# Patient Record
Sex: FEMALE | Race: WHITE | NOT HISPANIC OR LATINO | URBAN - METROPOLITAN AREA
[De-identification: names, ages, dates, MRNs, and addresses within clinical notes are randomized per-mention and may not be internally consistent; named-entity substitution may affect disease eponyms.]

---

## 2020-09-20 ENCOUNTER — INPATIENT HOSPITAL (OUTPATIENT)
Dept: URBAN - METROPOLITAN AREA HOSPITAL 130 | Facility: HOSPITAL | Age: 35
End: 2020-09-20

## 2020-09-20 DIAGNOSIS — E53.8 DEFICIENCY OF OTHER SPECIFIED B GROUP VITAMINS: ICD-10-CM

## 2020-09-20 DIAGNOSIS — K75.89 OTHER SPECIFIED INFLAMMATORY LIVER DISEASES: ICD-10-CM

## 2020-09-20 DIAGNOSIS — K90.9 INTESTINAL MALABSORPTION, UNSPECIFIED: ICD-10-CM

## 2020-09-20 DIAGNOSIS — D50.9 IRON DEFICIENCY ANEMIA, UNSPECIFIED: ICD-10-CM

## 2020-09-20 DIAGNOSIS — R65.20 SEVERE SEPSIS WITHOUT SEPTIC SHOCK: ICD-10-CM

## 2020-09-20 DIAGNOSIS — R94.5 ABNORMAL RESULTS OF LIVER FUNCTION STUDIES: ICD-10-CM

## 2020-09-20 PROCEDURE — 99222 1ST HOSP IP/OBS MODERATE 55: CPT | Performed by: INTERNAL MEDICINE

## 2020-09-21 ENCOUNTER — INPATIENT HOSPITAL (OUTPATIENT)
Dept: URBAN - METROPOLITAN AREA HOSPITAL 130 | Facility: HOSPITAL | Age: 35
End: 2020-09-21

## 2020-09-21 DIAGNOSIS — R65.20 SEVERE SEPSIS WITHOUT SEPTIC SHOCK: ICD-10-CM

## 2020-09-21 DIAGNOSIS — E53.8 DEFICIENCY OF OTHER SPECIFIED B GROUP VITAMINS: ICD-10-CM

## 2020-09-21 DIAGNOSIS — K75.89 OTHER SPECIFIED INFLAMMATORY LIVER DISEASES: ICD-10-CM

## 2020-09-21 DIAGNOSIS — D50.9 IRON DEFICIENCY ANEMIA, UNSPECIFIED: ICD-10-CM

## 2020-09-21 DIAGNOSIS — R94.5 ABNORMAL RESULTS OF LIVER FUNCTION STUDIES: ICD-10-CM

## 2020-09-21 PROCEDURE — 99231 SBSQ HOSP IP/OBS SF/LOW 25: CPT | Performed by: INTERNAL MEDICINE

## 2025-07-08 ENCOUNTER — OFFICE (OUTPATIENT)
Dept: URBAN - METROPOLITAN AREA CLINIC 19 | Facility: CLINIC | Age: 40
End: 2025-07-08
Payer: MEDICARE

## 2025-07-08 VITALS
HEIGHT: 65 IN | WEIGHT: 106 LBS | OXYGEN SATURATION: 100 % | SYSTOLIC BLOOD PRESSURE: 136 MMHG | DIASTOLIC BLOOD PRESSURE: 91 MMHG | HEART RATE: 105 BPM

## 2025-07-08 DIAGNOSIS — K29.60 OTHER GASTRITIS WITHOUT BLEEDING: ICD-10-CM

## 2025-07-08 DIAGNOSIS — K85.90 ACUTE PANCREATITIS WITHOUT NECROSIS OR INFECTION, UNSPECIFIE: ICD-10-CM

## 2025-07-08 DIAGNOSIS — K22.70 BARRETT'S ESOPHAGUS WITHOUT DYSPLASIA: ICD-10-CM

## 2025-07-08 DIAGNOSIS — Z90.49 ACQUIRED ABSENCE OF OTHER SPECIFIED PARTS OF DIGESTIVE TRACT: ICD-10-CM

## 2025-07-08 PROCEDURE — 99204 OFFICE O/P NEW MOD 45 MIN: CPT

## 2025-07-08 RX ORDER — PANTOPRAZOLE SODIUM 40 MG/1
80 TABLET, DELAYED RELEASE ORAL
Qty: 90 | Refills: 4 | Status: ACTIVE
Start: 2025-07-08

## 2025-07-08 NOTE — SERVICEHPINOTES
The patient is a 39 year old with a significant gastrointestinal history who presents for hospital follow-up after recent hospitalization for intractable nausea and vomiting.She was hospitalized at [facility] from late April to early May 2025 for intractable nausea and vomiting. During this hospitalization, her CT scan was negative, but her lipase level was greater than 4000. She was placed on a pancreatic diet for four days but continued to feel unwell upon returning home. 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 She has had multiple abdominal surgeries due to adhesions, including small and large bowel resections. 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 She previously had a Dieudonne-en-Y placement of a J tube, which was removed due to migration issues, and was on home TPN for seven years, discontinued due to recurrent line infections. Weight remains stable at this time. She has a history of Collins's esophagus, with no surveillance since 2015, and her last colonoscopy was in 2014. During her recent hospitalization in May 2025, she underwent an an EGD revealed Collins's epithelium esophagitis, grade B erosive esophagitis, and moderately severe gastritis at the antrum. An abdominal ultrasound showed a normal gallbladder and pancreas, with no evidence of stones or liver abnormalities. She is currently on Protonix 40 mg twice daily for her Collins's esophagus and uses Miralax for constipation related to pain medication useShe experiences intermittent constipation and explosive diarrhea, which she has had throughout her life. She takes PRN miralax for her constipation. She describes a sensation of something getting 'caught' in her diaphragm area, possibly related to her diaphragmatic hernia and associated scar tissue.br   
br Denies any other GI symptoms. No nausea, vomiting, hematemesis, or hematochezia noted.

## 2025-07-08 NOTE — SERVICENOTES
Parts of today's note were obtained using GEOVANNY toney, after obtaining verbal consent from the patient.

Patient doing ok at this time. Denies any current GI symptoms. Will have patient folllow up in 6 months to decide on EGD to asses her Collins's.